# Patient Record
Sex: FEMALE | URBAN - METROPOLITAN AREA
[De-identification: names, ages, dates, MRNs, and addresses within clinical notes are randomized per-mention and may not be internally consistent; named-entity substitution may affect disease eponyms.]

---

## 2019-07-11 ENCOUNTER — OFFICE VISIT (OUTPATIENT)
Dept: NEUROLOGY | Age: 39
End: 2019-07-11

## 2019-07-11 VITALS
SYSTOLIC BLOOD PRESSURE: 112 MMHG | RESPIRATION RATE: 18 BRPM | BODY MASS INDEX: 18.9 KG/M2 | OXYGEN SATURATION: 98 % | DIASTOLIC BLOOD PRESSURE: 80 MMHG | HEIGHT: 70 IN | HEART RATE: 83 BPM | WEIGHT: 132 LBS

## 2019-07-11 RX ORDER — SUMATRIPTAN 50 MG/1
50 TABLET, FILM COATED ORAL
COMMUNITY
End: 2020-04-30

## 2019-07-11 RX ORDER — RIZATRIPTAN BENZOATE 5 MG/1
5 TABLET ORAL
Qty: 9 TAB | Refills: 1 | Status: SHIPPED | OUTPATIENT
Start: 2019-07-11 | End: 2019-10-02 | Stop reason: SDUPTHER

## 2019-07-11 RX ORDER — NORTRIPTYLINE HYDROCHLORIDE 25 MG/1
25 CAPSULE ORAL
Qty: 30 CAP | Refills: 2 | Status: SHIPPED | OUTPATIENT
Start: 2019-07-11 | End: 2019-10-02 | Stop reason: SDUPTHER

## 2019-07-11 RX ORDER — IBUPROFEN 200 MG
TABLET ORAL
COMMUNITY
End: 2020-04-30

## 2019-07-11 NOTE — PROGRESS NOTES
[facility]   NEW PATIENT EVALUATION/CONSULTATION       PATIENT NAME: Ngozi Nicholson    MRN: [id number]    REASON FOR CONSULTATION: Headaches    07/11/19      Previous records (physician notes, laboratory reports, and radiology reports) and imaging studies were reviewed and summarized. My recommendations will be communicated back to the patient's physician(s) via electronic medical record and/or by [facility] [facility] mail. HISTORY OF PRESENT ILLNESS:  Ngozi Nicholson is a 45 y.o.  female presenting for evaluation of headaches. Headaches present over the past 6 years, stable since onset. Location: Bi-temporal, frontal  Character: pressure  Intensity: On average 9/10  Frequency: >15 days  # HA free days per month: <15 days  Duration: several days  Aura: None  Associated Sx with HA: +nausea/vomiting, +phonophotophobia. Denies unilateral ptosis, conjunctival injection, lacrimation, sweating  Neurological ROS: Denies focal weakness, numbness or vision loss associated with headaches  Systemic ROS:   Caffeine use: 1 cup daily  H/O Head trauma: None  Depressive or anxiety Sx: +Anxiety    Any change in pattern of HA? See above    Triggers: Odors. No worsening reported with cough/sneeze, bending over  Alleviating factors: Rest/sleep  FHx HA/migraine:  None    Treatment so far: Sumatriptan-not effective, Ibuprofen. No prior preventative therapies. Investigations so far:  CJW- Head CT normal per pt 1-2 years ago      PAST MEDICAL HISTORY:  Past Medical History:   Diagnosis Date    Headache        PAST SURGICAL HISTORY:  History reviewed. No pertinent surgical history.     FAMILY HISTORY:   Family History   Problem Relation Age of Onset    Cancer Father     Heart Disease Father     Stroke Paternal Grandfather          SOCIAL HISTORY:  Social History     Socioeconomic History    Marital status: UNKNOWN     Spouse name: Not on file    Number of children: Not on file    Years of education: Not on file   [facility] Highest education level: Not on file   Tobacco Use    Smoking status: Never Smoker    Smokeless tobacco: Never Used   Substance and Sexual Activity    Alcohol use: Never     Frequency: Never         MEDICATIONS:   Current Outpatient Medications   Medication Sig Dispense Refill    SUMAtriptan (IMITREX) 50 mg tablet Take 50 mg by mouth once as needed for Migraine.  ibuprofen (MOTRIN IB) 200 mg tablet Take  by mouth. ALLERGIES:  No Known Allergies      REVIEW OF SYSTEMS:  10 point ROS reviewed with patient. Please see scanned document under media. PHYSICAL EXAM:  Vital Signs:   Visit Vitals  /80   Pulse 83   Resp 18   Ht 5' 10\" (1.778 m)   Wt 59.9 kg (132 lb)   SpO2 98%   BMI 18.94 kg/m²        General Medical Exam:  General:  Well appearing, comfortable, in no apparent distress. Eyes/ENT: see cranial nerve examination. Neck: No masses appreciated. Full range of motion without tenderness. Respiratory:  Clear to auscultation, good air entry bilaterally. Cardiac:  Regular rate and rhythm, no murmur. GI:  Soft, non-tender, non-distended abdomen. Bowel sounds normal. No masses, organomegaly. Extremities:  No deformities, edema, or skin discoloration. Skin:  No rashes or lesions. Neurological:  · Mental Status:  Alert and oriented to person, place, and time with fluent speech. · Cranial Nerves:   CNII/III/IV/VI: Optic disc w/clear margins b/l, visual fields full to confrontation, EOMI, PERRL, no ptosis or nystagmus. CN V: Facial sensation intact bilaterally, masseter 5/5   CN VII: Facial muscles symmetric and strong   CN VIII: Hears finger rub well bilaterally, intact vestibular function   CN IX/X: Normal palatal movement   CN XI: Full strength shoulder shrug bilaterally   CN XII: Tongue protrusion full and midline without fasciculation or atrophy  · Motor: Normal tone and muscle bulk with no pronator drift. No atrophy or fasciculations present on examination.   Individual muscle group testing:  Shoulder abduction:   Left:5/5   Right : 5/5    Shoulder adduction:   Left:5/5   Right : 5/5    Elbow flexion:      Left:5/5   Right : 5/5  Elbow extension:    Left:5/5   Right : 5/5   Wrist flexion:    Left:5/5   Right : 5/5  Wrist extension:    Left:5/5   Right : 5/5  Arm pronation:   Left:5/5   Right : 5/5  Arm supination:   Left:5/5   Right : 5/5    Finger flexion:    Left:5/5   Right : 5/5    Finger extension:   Left:5/5   Right : 5/5   Finger abduction:  Left:5/5   Right : 5/5   Finger adduction:   Left:5/5   Right : 5/5  Hip flexion:     Left:5/5   Right : 5/5         Hip extension:   Left:5/5   Right : 5/5    Knee flexion:    Left:5/5   Right : 5/5    Knee extension:   Left:5/5   Right : 5/5    Dorsiflexion:     Left:5/5   Right : 5/5  Plantar flexion:    Left:5/5   Right : 5/5      · MSRs: No crossed adductors or clonus. RIGHT  LEFT   Brachioradialis 2+ 2+   Biceps 2+ 2+   Triceps 2+ 2+   Knee 2+ 2+   Achilles 2+ 2+        Plantar response Downward Downward          · Sensation: Normal and symmetric perception of pinprick, temperature, light touch, proprioception, and vibration; (-) Romberg. · Coordination: No dysmetria. Normal rapid alternating movements; finger-to-nose and heel-to- shin testing are within normal limits. · Gait: Normal native gait    PERTINENT DATA:  OUTSIDE RECORDS:  The patient provided outside medical records which were reviewed during the course of the visit. The relevant detail are summarized above. ASSESSMENT:      ICD-10-CM ICD-9-CM    1. Chronic migraine G43.709 29.70    45year old AAF presenting with chronic migraines without aura since 2013 following the birth of her last child. Neurological examination is non-focal and essentially normal.  She has completed neuroimaging/head CT during prior ED evaluations for headache which have returned normal per patient.   Defer additional neuroimaging at this time given her benign examination and presentation. We discussed options for migraine prophylaxis today including TCA therapy and potential side effects. She elects to proceed. She was also educated on appropriate use of rescue HA medication/triptans to avoid rebound phenomenon. Headache education  Discussed pathophysiology of headache. Discussed use of headache diary. Discussed treatment options, both abortive and preventive medications. Instructed patient about medications and potential side effects. Discussed medication overuse headache and to limit use of analgesics to less than 2 doses per week. PLAN:  · Start Nortriptyline 25mg qhs for migraine ppx  · Trial of Maxalt 5mg PRN for rescue migraine tx       Follow-up and Dispositions    · Return in about 3 months (around 10/11/2019). Leticia Lopez, DO  Staff Neurologist  Diplomate, [facility] Board of Psychiatry & Neurology

## 2019-07-11 NOTE — PATIENT INSTRUCTIONS
PRESCRIPTION REFILL POLICY [facility] Statement to Patients April 1, 2014 In an effort to ensure the large volume of patient prescription refills is processed in the most efficient and expeditious manner, we are asking our patients to assist us by calling your Pharmacy for all prescription refills, this will include also your  Mail Order Pharmacy. The pharmacy will contact our office electronically to continue the refill process. Please do not wait until the last minute to call your pharmacy. We need at least 48 hours (2days) to fill prescriptions. We also encourage you to call your pharmacy before going to  your prescription to make sure it is ready. With regard to controlled substance prescription refill requests (narcotic refills) that need to be picked up at our office, we ask your cooperation by providing us with at least 72 hours (3days) notice that you will need a refill. We will not refill narcotic prescription refill requests after 4:00pm on any weekday, Monday through Thursday, or after 2:00pm on Fridays, or on the weekends. We encourage everyone to explore another way of getting your prescription refill request processed using [employer], our patient web portal through our electronic medical record system. [employer] is an efficient and effective way to communicate your medication request directly to the office and  downloadable as an idalia on your smart phone . [employer] also features a review functionality that allows you to view your medication list as well as leave messages for your physician. Are you ready to get connected? If so please review the attatched instructions or speak to any of our staff to get you set up right away! Thank you so much for your cooperation. Should you have any questions please contact our Practice Administrator. The Physicians and Staff,  [facility]

## 2019-10-07 RX ORDER — NORTRIPTYLINE HYDROCHLORIDE 25 MG/1
CAPSULE ORAL
Qty: 30 CAP | Refills: 2 | Status: SHIPPED | OUTPATIENT
Start: 2019-10-07 | End: 2019-10-29 | Stop reason: DRUGHIGH

## 2019-10-07 RX ORDER — RIZATRIPTAN BENZOATE 5 MG/1
TABLET ORAL
Qty: 9 TAB | Refills: 0 | Status: SHIPPED | OUTPATIENT
Start: 2019-10-07 | End: 2020-04-30

## 2019-10-29 ENCOUNTER — OFFICE VISIT (OUTPATIENT)
Dept: NEUROLOGY | Age: 39
End: 2019-10-29

## 2019-10-29 VITALS
HEART RATE: 102 BPM | DIASTOLIC BLOOD PRESSURE: 82 MMHG | WEIGHT: 140 LBS | OXYGEN SATURATION: 98 % | SYSTOLIC BLOOD PRESSURE: 128 MMHG | BODY MASS INDEX: 20.09 KG/M2 | RESPIRATION RATE: 18 BRPM

## 2019-10-29 DIAGNOSIS — M54.2 CERVICALGIA: ICD-10-CM

## 2019-10-29 RX ORDER — ELETRIPTAN HYDROBROMIDE 40 MG/1
40 TABLET, FILM COATED ORAL
Qty: 9 TAB | Refills: 1 | Status: SHIPPED | OUTPATIENT
Start: 2019-10-29 | End: 2020-01-21

## 2019-10-29 RX ORDER — NORTRIPTYLINE HYDROCHLORIDE 50 MG/1
50 CAPSULE ORAL
Qty: 90 CAP | Refills: 0 | Status: SHIPPED | OUTPATIENT
Start: 2019-10-29 | End: 2020-04-30

## 2019-10-29 NOTE — PATIENT INSTRUCTIONS
[facility]   Statement to Patients  April 1, 2014      In an effort to ensure the large volume of patient prescription refills is processed in the most efficient and expeditious manner, we are asking our patients to assist us by calling your Pharmacy for all prescription refills, this will include also your  Mail Order Pharmacy. The pharmacy will contact our office electronically to continue the refill process. Please do not wait until the last minute to call your pharmacy. We need at least 48 hours (2days) to fill prescriptions. We also encourage you to call your pharmacy before going to  your prescription to make sure it is ready. With regard to controlled substance prescription refill requests (narcotic refills) that need to be picked up at our office, we ask your cooperation by providing us with at least 72 hours (3days) notice that you will need a refill. We will not refill narcotic prescription refill requests after 4:00pm on any weekday, Monday through Thursday, or after 2:00pm on Fridays, or on the weekends. We encourage everyone to explore another way of getting your prescription refill request processed using [employer], our patient web portal through our electronic medical record system. [employer] is an efficient and effective way to communicate your medication request directly to the office and  downloadable as an idalia on your smart phone . [employer] also features a review functionality that allows you to view your medication list as well as leave messages for your physician. Are you ready to get connected? If so please review the attatched instructions or speak to any of our staff to get you set up right away! Thank you so much for your cooperation. Should you have any questions please contact our Practice Administrator.     The Physicians and Staff,  [facility]

## 2019-10-29 NOTE — PROGRESS NOTES
Neurology Clinic Follow up Note    Patient ID:  Jevon Nichols  [id number]  41 y.o.  1980      Ms. Nisha Lugo is here for follow up today of  Chief Complaint   Patient presents with    Headache          Last Appointment With Me:  7/11/2019       Interval History:   Headaches are presently 3-4x weekly on average, slight reduction from initial visit. Headaches are located in various locations without nausea, duration: 2-3 days. She is using Maxalt with limited effectiveness. On Nortriptyline 25mg qhs for migraine ppx. No reported side effects. She is reporting some mild cervicalgia preceding some of her headaches. PMHx/ PSHx/ FHx/ SHx:  Reviewed and unchanged previous visit. Past Medical History:   Diagnosis Date    Headache          ROS:  Comprehensive review of systems negative except for as noted above. Objective:       Meds:  Current Outpatient Medications   Medication Sig Dispense Refill    nortriptyline (PAMELOR) 25 mg capsule TAKE 1 CAPSULE BY MOUTH EVERY NIGHT 30 Cap 2    rizatriptan (MAXALT) 5 mg tablet TAKE 1 TABLET BY MOUTH ONCE AS NEEDED FOR MIGRAINE FOR UP TO 1 DOSE. MAY REPEAT IN 2 HOURS IF NEEDED 9 Tab 0    SUMAtriptan (IMITREX) 50 mg tablet Take 50 mg by mouth once as needed for Migraine.  ibuprofen (MOTRIN IB) 200 mg tablet Take  by mouth. Exam:  Visit Vitals  /82   Pulse (!) 102   Resp 18   Wt 63.5 kg (140 lb)   SpO2 98%   BMI 20.09 kg/m²     NEUROLOGICAL EXAM:  General: Awake, alert, speech fluent  CN: PERRL, EOMI without nystagmus, VFF to confrontation, facial sensation and strength are normal and symmetric, hearing is intact to finger rub bilaterally, palate and tongue movements are intact and symmetric. Motor: Normal tone, bulk and strength bilaterally. Reflexes: 2/4 and symmetric, plantar stimulation is flexor. Coordination: FNF, ETHAN, HTS intact. Sensation: LT intact throughout. Gait: Normal-based and steady.     LABS  No results found for this or any previous visit. IMAGING:  MRI Results (most recent):  No results found for this or any previous visit. Assessment:     Encounter Diagnoses     ICD-10-CM ICD-9-CM   1. Chronic migraine G43.709 346.70   2. Cervicalgia M54.2 67.1     44year old AAF here for f/u regarding chronic migraines without aura since 2013 following the birth of her last child. Neurological examination is non-focal and essentially normal.  She has completed neuroimaging/head CT during prior ED evaluations for headache which have returned normal per patient. Defer additional neuroimaging at this time given her benign examination and presentation. We discussed options for migraine prophylaxis including TCA therapy and potential side effects. She was also educated on appropriate use of rescue HA medication/triptans to avoid rebound phenomenon. Headaches are slightly improved on TCA therapy. Will continue titration of medication to optimize headache control. She was cautioned regarding possible fatigue, dry eyes/mouth, less common cardiac conduction abnormalities. Headache education  Discussed pathophysiology of headache. Discussed use of headache diary. Discussed treatment options, both abortive and preventive medications. Instructed patient about medications and potential side effects. Discussed medication overuse headache and to limit use of analgesics to less than 2 doses per week. Plan:   Increase Nortriptyline to 50mg qhs for migraine ppx  Trial of Relpax PRN for rescue migraine tx given limited effectiveness with Imitrex/Maxalt. Follow-up and Dispositions    · Return in about 3 months (around 1/29/2020).          Signed:  Sheree Munguia,   10/29/2019

## 2020-01-21 RX ORDER — ELETRIPTAN HYDROBROMIDE 40 MG/1
TABLET, FILM COATED ORAL
Qty: 9 TAB | Refills: 1 | Status: SHIPPED | OUTPATIENT
Start: 2020-01-21 | End: 2020-04-30

## 2020-04-30 ENCOUNTER — VIRTUAL VISIT (OUTPATIENT)
Dept: NEUROLOGY | Age: 40
End: 2020-04-30

## 2020-04-30 VITALS — WEIGHT: 140 LBS | RESPIRATION RATE: 18 BRPM | HEIGHT: 70 IN | BODY MASS INDEX: 20.04 KG/M2

## 2020-04-30 DIAGNOSIS — G44.40 REBOUND HEADACHE: ICD-10-CM

## 2020-04-30 RX ORDER — NORTRIPTYLINE HYDROCHLORIDE 50 MG/1
50 CAPSULE ORAL
Qty: 90 CAP | Refills: 0 | Status: SHIPPED | OUTPATIENT
Start: 2020-04-30 | End: 2020-09-09

## 2020-04-30 NOTE — PROGRESS NOTES
Neurology Clinic Follow up Note    Patient ID:  Janeth Jain  [id number]  73 y.o.  1980      Ms. Tasia Little is here for follow up today of  Chief Complaint   Patient presents with    Headache      This is a telemedicine visit that was performed with the originating site at [facility] and the distant site at patient's home. Verbal consent to participate in video visit was obtained. The patient was identified by name and date of birth. This visit occurred during the Coronavirus [phone]) [facility]. I discussed with the patient the nature of our telemedicine visits, that:     I would evaluate the patient and recommend diagnostics and treatments based on my assessment   Our sessions are not being recorded and that personal health information is protected   Our team would provide follow up care in person if/when the patient needs it      Last Appointment With Me:  10/29/2019    Interval History:   Headaches are constant, daily since 4/1/20. The month prior she had migraines 2x weekly. Headaches are pacheco-orbital, frontal, temporal, or occipital.  She has noted jaw pain on one occasion with her headaches. No N/V, +photophobia, +loss of appetite. Headaches typically last 24h. She is taking BC powder and benadryl daily for headache. She stopped preventative therapy/Nortriptyline 2-3 months ago. PMHx/ PSHx/ FHx/ SHx:  Reviewed and unchanged previous visit. Past Medical History:   Diagnosis Date    Headache          ROS:  Comprehensive review of systems negative except for as noted above. Objective:       Meds:  None    Exam:  Visit Vitals  Resp 18   Ht 5' 10\" (1.778 m)   Wt 63.5 kg (140 lb)   BMI 20.09 kg/m²     Due to this being a TeleHealth evaluation, many elements of the physical examination are unable to be assessed.    Exam:  NEUROLOGICAL EXAM:  General: Awake, alert, speech fluent  CN: EOMI, VF intact, facial strength/sensation normal and symmetric, hearing is intact  Motor: AG x 4  Reflexes: deferred  Coordination: No ataxia. Sensation: LT intact throughout  Gait: Steady    LABS  No results found for this or any previous visit. IMAGING:  MRI Results (most recent):  No results found for this or any previous visit. Assessment:     Encounter Diagnoses     ICD-10-CM ICD-9-CM   1. Chronic migraine G43.709 346.70   2. Rebound headache G44.40 35.3   44year old AAF here for f/u regarding chronic migraines without aura since 2013 following the birth of her last child. Neurological examination is non-focal and essentially normal.  She has completed neuroimaging/head CT during prior ED evaluations for headache which have returned normal per patient. Migraines were improved on TCA therapy, however she has noted return of chronic migraines after running out of medication 2-3 months ago due to missed follow up appointments. Suspect a component of rebound headache due to daily analgesic use. As she previously tolerated Nortriptyline well, will resume medication for migraine prevention. She was advised regarding possible fatigue, dry eyes/mouth, less common cardiac conduction abnormalities. Headache education  Discussed pathophysiology of headache. Discussed use of headache diary. Discussed treatment options, both abortive and preventive medications. Instructed patient about medications and potential side effects. Discussed medication overuse headache and to limit use of analgesics to less than 2 doses per week. Plan:   Resume Nortriptyline 50mg qhs for migraine ppx  Limit use of OTC analgesics to no more than twice weekly to avoid rebound headaches    Follow-up and Dispositions    · Return in about 2 months (around 6/30/2020).          Signed:  Carleen Summers DO  4/30/2020

## 2020-09-03 ENCOUNTER — TELEPHONE (OUTPATIENT)
Dept: NEUROLOGY | Facility: CLINIC | Age: 40
End: 2020-09-03

## 2020-09-03 NOTE — TELEPHONE ENCOUNTER
Pt called to schedule a f/u because she is still having bad migraines. She said she cannot wait until 10/7.  She is requesting a call back

## 2020-09-03 NOTE — TELEPHONE ENCOUNTER
NP calling to let r/s appt from today with NP, pt is currently at [facility] ED. Pt is now scheduled with NP for 9/15. Also wanting Dr Jing Burger to know that she will be faxing over her notes, they are recommending imaging but pt does not want to stay at the hospital, hoping it can be ordered at her follow up if we feel it is needed.

## 2020-09-09 ENCOUNTER — DOCUMENTATION ONLY (OUTPATIENT)
Dept: NEUROLOGY | Facility: CLINIC | Age: 40
End: 2020-09-09

## 2020-09-09 ENCOUNTER — OFFICE VISIT (OUTPATIENT)
Dept: NEUROLOGY | Facility: CLINIC | Age: 40
End: 2020-09-09
Payer: COMMERCIAL

## 2020-09-09 VITALS
RESPIRATION RATE: 18 BRPM | DIASTOLIC BLOOD PRESSURE: 74 MMHG | SYSTOLIC BLOOD PRESSURE: 108 MMHG | OXYGEN SATURATION: 98 % | BODY MASS INDEX: 20.09 KG/M2 | HEART RATE: 67 BPM | WEIGHT: 140 LBS

## 2020-09-09 DIAGNOSIS — G44.40 REBOUND HEADACHE: ICD-10-CM

## 2020-09-09 PROCEDURE — 99214 OFFICE O/P EST MOD 30 MIN: CPT | Performed by: PSYCHIATRY & NEUROLOGY

## 2020-09-09 NOTE — PATIENT INSTRUCTIONS
PRESCRIPTION REFILL POLICY [facility] Statement to Patients April 1, 2014 In an effort to ensure the large volume of patient prescription refills is processed in the most efficient and expeditious manner, we are asking our patients to assist us by calling your Pharmacy for all prescription refills, this will include also your  Mail Order Pharmacy. The pharmacy will contact our office electronically to continue the refill process. Please do not wait until the last minute to call your pharmacy. We need at least 48 hours (2days) to fill prescriptions. We also encourage you to call your pharmacy before going to  your prescription to make sure it is ready. With regard to controlled substance prescription refill requests (narcotic refills) that need to be picked up at our office, we ask your cooperation by providing us with at least 72 hours (3days) notice that you will need a refill. We will not refill narcotic prescription refill requests after 4:00pm on any weekday, Monday through Thursday, or after 2:00pm on Fridays, or on the weekends. We encourage everyone to explore another way of getting your prescription refill request processed using [employer], our patient web portal through our electronic medical record system. [employer] is an efficient and effective way to communicate your medication request directly to the office and  downloadable as an idalia on your smart phone . [employer] also features a review functionality that allows you to view your medication list as well as leave messages for your physician. Are you ready to get connected? If so please review the attatched instructions or speak to any of our staff to get you set up right away! Thank you so much for your cooperation. Should you have any questions please contact our Practice Administrator. The Physicians and Staff,  [facility]

## 2020-09-09 NOTE — PROGRESS NOTES
Neurology Clinic Follow up Note    Patient ID:  Sara Garcia  [id number]  1 y.o.  1980      Ms. Deshawn Sheets is here for follow up today of  Chief Complaint   Patient presents with    Headache      Last Appointment With Me:  4/30/2020      Interval History:   She reports recent ED evaluation last week as well as urgent care evaluations for headaches. OSH records reviewed. She was experiencing \"stroke like symptoms\" involving b/l facial numbness, b/l R>L UE/LE numbness, slurred speech, generalized weakness and \"seizure like activity\" (she cannot provide further details pertaining to this). She noted vomiting, blurred vision and headache that evening. She endorses headaches constantly over the past 2 months. She discontinued Nortriptyline in August as she did not feel medication was effective. Headaches are pacheco-orbital, frontal, temporal, or occipital, +N/V, +photophobia, +loss of appetite. She was taking ibuprofen near daily due to above headaches but discontinued recently due to concerns regarding adverse effects from medication overuse. During evaluation at [address] [place] [address], MRI Brain was performed showing no acute abnormalities. She was treated for suspected migraine variant and discharged home. PMHx/ PSHx/ FHx/ SHx:  Reviewed and unchanged previous visit. Past Medical History:   Diagnosis Date    Headache          ROS:  Comprehensive review of systems negative except for as noted above. Objective:       Meds:  None    Exam:  Visit Vitals  /74   Pulse 67   Resp 18   Wt 63.5 kg (140 lb)   SpO2 98%   BMI 20.09 kg/m²     Exam:  NEUROLOGICAL EXAM:  General: Awake, alert, speech fluent  CN: EOMI, VF intact, facial strength/sensation normal and symmetric, hearing is intact. No evidence of papilledema. Motor: AG x 4  Reflexes: deferred  Coordination: No ataxia. Sensation: LT intact throughout  Gait: Steady    LABS  No results found for this or any previous visit.     IMAGING:  MRI Results (most recent):  No results found for this or any previous visit. Assessment/Plan:     Encounter Diagnoses     ICD-10-CM ICD-9-CM   1. Chronic migraine  G43.709 346.70   2. Rebound headache  G44.40 35.3   44year old AAF here for f/u regarding chronic migraines without aura since 2013 following the birth of her last child. Neurological examination is non-focal and essentially normal.  She has completed neuroimaging including prior head CT as well as recent MRI Brain which did not reveal any structural pathology contributing to above chronic migraines. She endorses recent episode associated with b/l paresthesias R>L, generalized weakness and slurred speech followed by migraine prompting ED evaluation at [facility]. Records reviewed with essentially negative stroke evaluation. Low suspicion for TIA given bilateral symptomatic distribution and age. Suspect above presentation was related to migraine variant. She self-discontinued TCA therapy several weeks ago as she did not find the medication particularly effective. We discussed alternative options for migraine prophylaxis including CGRP receptor antagonist and potential side effects. We also reviewed the adverse effects of daily analgesic use and contribution to potential rebound headaches. She declines alternative therapy for migraine prevention at this time and plans to seek another opinion from [facility]. Headache education  Discussed pathophysiology of headache. Discussed use of headache diary. Discussed treatment options, both abortive and preventive medications. Instructed patient about medications and potential side effects. Discussed medication overuse headache and to limit use of analgesics to less than 2 doses per week. Signed:  Jim Blount DO  9/9/2020      The duration of this appointment visit was 25 minutes of face-to-face time with the patient.   At least 50% of this time was spent in counseling, explanation of diagnosis, planning of further management, and coordination of care.